# Patient Record
Sex: FEMALE | Race: ASIAN | NOT HISPANIC OR LATINO | Employment: UNEMPLOYED | URBAN - METROPOLITAN AREA
[De-identification: names, ages, dates, MRNs, and addresses within clinical notes are randomized per-mention and may not be internally consistent; named-entity substitution may affect disease eponyms.]

---

## 2024-08-23 ENCOUNTER — LAB (OUTPATIENT)
Dept: LAB | Facility: LAB | Age: 44
End: 2024-08-23
Payer: COMMERCIAL

## 2024-08-23 ENCOUNTER — APPOINTMENT (OUTPATIENT)
Dept: PRIMARY CARE | Facility: CLINIC | Age: 44
End: 2024-08-23

## 2024-08-23 VITALS
SYSTOLIC BLOOD PRESSURE: 120 MMHG | TEMPERATURE: 98 F | HEIGHT: 59 IN | DIASTOLIC BLOOD PRESSURE: 85 MMHG | BODY MASS INDEX: 31.45 KG/M2 | OXYGEN SATURATION: 100 % | HEART RATE: 54 BPM | WEIGHT: 156 LBS | RESPIRATION RATE: 18 BRPM

## 2024-08-23 DIAGNOSIS — N94.6 DYSMENORRHEA: ICD-10-CM

## 2024-08-23 DIAGNOSIS — Z12.31 ENCOUNTER FOR SCREENING MAMMOGRAM FOR BREAST CANCER: Primary | ICD-10-CM

## 2024-08-23 DIAGNOSIS — Z00.00 ANNUAL PHYSICAL EXAM: ICD-10-CM

## 2024-08-23 LAB
ALBUMIN SERPL BCP-MCNC: 4.7 G/DL (ref 3.4–5)
ALP SERPL-CCNC: 75 U/L (ref 33–110)
ALT SERPL W P-5'-P-CCNC: 18 U/L (ref 7–45)
ANION GAP SERPL CALC-SCNC: 15 MMOL/L (ref 10–20)
AST SERPL W P-5'-P-CCNC: 27 U/L (ref 9–39)
BILIRUB SERPL-MCNC: 0.6 MG/DL (ref 0–1.2)
BUN SERPL-MCNC: 16 MG/DL (ref 6–23)
CALCIUM SERPL-MCNC: 9.9 MG/DL (ref 8.6–10.3)
CHLORIDE SERPL-SCNC: 101 MMOL/L (ref 98–107)
CHOLEST SERPL-MCNC: 218 MG/DL (ref 0–199)
CHOLESTEROL/HDL RATIO: 3.4
CO2 SERPL-SCNC: 26 MMOL/L (ref 21–32)
CREAT SERPL-MCNC: 0.85 MG/DL (ref 0.5–1.05)
EGFRCR SERPLBLD CKD-EPI 2021: 88 ML/MIN/1.73M*2
ERYTHROCYTE [DISTWIDTH] IN BLOOD BY AUTOMATED COUNT: 12.4 % (ref 11.5–14.5)
EST. AVERAGE GLUCOSE BLD GHB EST-MCNC: 105 MG/DL
GLUCOSE SERPL-MCNC: 89 MG/DL (ref 74–99)
HBA1C MFR BLD: 5.3 %
HCT VFR BLD AUTO: 45.4 % (ref 36–46)
HDLC SERPL-MCNC: 64.2 MG/DL
HGB BLD-MCNC: 14.7 G/DL (ref 12–16)
LDLC SERPL CALC-MCNC: 138 MG/DL
MCH RBC QN AUTO: 27.9 PG (ref 26–34)
MCHC RBC AUTO-ENTMCNC: 32.4 G/DL (ref 32–36)
MCV RBC AUTO: 86 FL (ref 80–100)
NON HDL CHOLESTEROL: 154 MG/DL (ref 0–149)
NRBC BLD-RTO: 0 /100 WBCS (ref 0–0)
PLATELET # BLD AUTO: 301 X10*3/UL (ref 150–450)
POTASSIUM SERPL-SCNC: 4.4 MMOL/L (ref 3.5–5.3)
PROT SERPL-MCNC: 7.9 G/DL (ref 6.4–8.2)
RBC # BLD AUTO: 5.26 X10*6/UL (ref 4–5.2)
SODIUM SERPL-SCNC: 138 MMOL/L (ref 136–145)
TRIGL SERPL-MCNC: 79 MG/DL (ref 0–149)
VLDL: 16 MG/DL (ref 0–40)
WBC # BLD AUTO: 7.3 X10*3/UL (ref 4.4–11.3)

## 2024-08-23 PROCEDURE — 99386 PREV VISIT NEW AGE 40-64: CPT | Performed by: PHYSICIAN ASSISTANT

## 2024-08-23 PROCEDURE — 80061 LIPID PANEL: CPT

## 2024-08-23 PROCEDURE — 36415 COLL VENOUS BLD VENIPUNCTURE: CPT

## 2024-08-23 PROCEDURE — 80053 COMPREHEN METABOLIC PANEL: CPT

## 2024-08-23 PROCEDURE — 83036 HEMOGLOBIN GLYCOSYLATED A1C: CPT

## 2024-08-23 PROCEDURE — 85027 COMPLETE CBC AUTOMATED: CPT

## 2024-08-23 PROCEDURE — 3008F BODY MASS INDEX DOCD: CPT | Performed by: PHYSICIAN ASSISTANT

## 2024-08-23 RX ORDER — BISMUTH SUBSALICYLATE 262 MG
1 TABLET,CHEWABLE ORAL DAILY
COMMUNITY

## 2024-08-23 RX ORDER — ACETAMINOPHEN 500 MG
TABLET ORAL DAILY
COMMUNITY

## 2024-08-23 ASSESSMENT — ENCOUNTER SYMPTOMS
CONSTIPATION: 0
NAUSEA: 0
MYALGIAS: 0
SHORTNESS OF BREATH: 0
EYE PAIN: 0
DIARRHEA: 0
ARTHRALGIAS: 0
ABDOMINAL PAIN: 0
VOMITING: 0

## 2024-08-23 NOTE — ASSESSMENT & PLAN NOTE
PREVENTIVE CARE SCREENING:  - Labs:  Due, ordered today  Vaccines:   - Tetanus (q10yrs): Has records from [facility] but did not bring today. Will bring next week.  Women's health:  - PAP: DUE. Pt interested, encouraged to schedule  - Mammogram: discussed risks vs benefits - pt elects to mammogram   Lifestyle Modification:  - Discussed DIET -  Continue to limit snacks, processed foods, sugary and greasy foods, fast foods. Continue with healthy alternatives, whole grains, fruits vegetables.  - Encouraged to take daily multivitamin.    - Discussed EXERCISE -   Recommended weight training for bone health and 30 minutes of cardiovascular exercise 5-7 days a week.  - Encouraged pt to get yearly eye and dental exams

## 2024-08-23 NOTE — PROGRESS NOTES
"Subjective   Patient ID: Netta Banda is a 43 y.o. female who presents for Establish Care (Pt here today to [facility]; last check up was in the [facility] a year ago. Needing a check up, lab orders, Mammogram, etc. Lives in [place] but wanting a doctor here. ).    HPI     Preventive:   - Lives at home in -- with --   - Employment -   - Labs:   - Colon CA:  - Mamm:  - PAP:   - DEXA:   - Flu:  - Shingrix:  - Pneumovax/ Prevnar:  - Td:  - COVID-19 vax:   - Lung CA screen (Smoker):   - Diet:   - Exercise:   - Sexual hx:   - Tobacco:   - Illicit drugs:   - Alcohol:   - Mood:   - Dentist visits:  - Eye exams:          Review of Systems    Objective   /90   Pulse 54   Temp 36.7 °C (98 °F)   Resp 18   Ht 1.486 m (4' 10.5\")   Wt 70.8 kg (156 lb)   SpO2 100%   BMI 32.05 kg/m²     Physical Exam    Assessment/Plan     Problem List Items Addressed This Visit    None      "

## 2024-08-23 NOTE — PROGRESS NOTES
"Subjective   Patient ID: Netta Banda is a 43 y.o. female who presents for Establish Care (Pt here today to [facility]; last check up was in the [facility] a year ago. Needing a check up, lab orders, Mammogram, etc. Lives in [place] but wanting a doctor here. ).    HPI     Preventive:   - Lives at home in [place] with  ( Tao) and son (Michael).  - Employment - currently unemployed  - Labs: DUE  - Mamm: DUE. Pt interested   - PAP: DUE. Pt interested.   - Td: Has records from [facility] but did not bring today.   - Diet: States is going well right now. Lots of chicken and fish. Regularly having fruits and vegetables.  - Exercise: Briskly walks an hour every day  - Sexual hx: Yes  - Tobacco: No  - Illicit drugs: No  - Alcohol: No  - Mood: Feeling a little down and anxious due to not having a job right now  - Dentist visits: DUE  - Eye exams: DUE     Pre-Menopausal  - Still having regular period but is more painful than usual  -Also experience muscle pain and headaches while on her period    Review of Systems   Eyes:  Negative for pain and visual disturbance.   Respiratory:  Negative for shortness of breath.    Cardiovascular:  Negative for chest pain.   Gastrointestinal:  Negative for abdominal pain, constipation, diarrhea, nausea and vomiting.   Musculoskeletal:  Negative for arthralgias and myalgias.   Skin:  Negative for rash.       Objective   /85   Pulse 54   Temp 36.7 °C (98 °F)   Resp 18   Ht 1.486 m (4' 10.5\")   Wt 70.8 kg (156 lb)   SpO2 100%   BMI 32.05 kg/m²     Physical Exam  Constitutional:       General: She is not in acute distress.     Appearance: She is obese.   HENT:      Head: Normocephalic.      Right Ear: Tympanic membrane and ear canal normal.      Left Ear: Tympanic membrane and ear canal normal.      Nose: Nose normal.      Mouth/Throat:      Mouth: Mucous membranes are moist.      Pharynx: Oropharynx is clear.   Eyes:      Extraocular Movements: Extraocular movements intact.    "   Conjunctiva/sclera: Conjunctivae normal.      Pupils: Pupils are equal, round, and reactive to light.   Cardiovascular:      Rate and Rhythm: Normal rate and regular rhythm.      Pulses: Normal pulses.      Heart sounds: No murmur heard.  Pulmonary:      Effort: Pulmonary effort is normal.      Breath sounds: Normal breath sounds. No wheezing, rhonchi or rales.   Abdominal:      General: Bowel sounds are normal. There is no distension.      Palpations: Abdomen is soft. There is no mass.      Tenderness: There is no abdominal tenderness. There is no guarding.   Musculoskeletal:         General: Normal range of motion.      Cervical back: Neck supple.   Lymphadenopathy:      Cervical: No cervical adenopathy.   Skin:     General: Skin is warm and dry.      Findings: No lesion or rash.   Neurological:      General: No focal deficit present.      Mental Status: She is alert.      Gait: Gait normal.   Psychiatric:         Mood and Affect: Mood normal.         Assessment/Plan     Problem List Items Addressed This Visit       Annual physical exam    Overview     - Lives in [place] with  (Alexis) and son (Michael). Spends some time here as well living with mother in law.   - Employment - currently unemployed         Current Assessment & Plan     PREVENTIVE CARE SCREENING:  - Labs:  Due, ordered today  Vaccines:   - Tetanus (q10yrs): Has records from [facility] but did not bring today. Will bring next week.  Women's health:  - PAP: DUE. Pt interested, encouraged to schedule  - Mammogram: discussed risks vs benefits - pt elects to mammogram   Lifestyle Modification:  - Discussed DIET -  Continue to limit snacks, processed foods, sugary and greasy foods, fast foods. Continue with healthy alternatives, whole grains, fruits vegetables.  - Encouraged to take daily multivitamin.    - Discussed EXERCISE -   Recommended weight training for bone health and 30 minutes of cardiovascular exercise 5-7 days a week.  - Encouraged pt to  get yearly eye and dental exams          Relevant Orders    CBC (Completed)    Comprehensive Metabolic Panel (Completed)    Lipid Panel (Completed)    Hemoglobin A1C (Completed)     Other Visit Diagnoses       Encounter for screening mammogram for breast cancer    -  Primary    Relevant Orders    BI mammo bilateral screening tomosynthesis    Dysmenorrhea

## 2024-08-23 NOTE — PROGRESS NOTES
"Subjective   Patient ID: Netta Banda is a 43 y.o. female who presents for Establish Care (Pt here today to [facility]; last check up was in the [facility] a year ago. Needing a check up, lab orders, Mammogram, etc. Lives in [place] but wanting a doctor here. ).    HPI     Preventive:   - Lives at home in [place] with  ( Tao) and son (Michael).  - Employment - currently unemployed  - Labs: DUE  - Mamm: DUE. Pt interested   - PAP: DUE. Pt interested.   - Td: Has records from [facility] but did not bring today.   - Diet: States is going well right now. Lots of chicken and fish. Regularly having fruits and vegetables.  - Exercise: Briskly walks an hour every day  - Sexual hx: Yes  - Tobacco: No  - Illicit drugs: No  - Alcohol: No  - Mood: Feeling a little down and anxious due to not having a job right now  - Dentist visits: DUE  - Eye exams: DUE    Dysmenorrhea  - Still having regular period but is more painful than usual  -Also experience muscle pain and headaches while on her period  -Denies hot flashes    Review of Systems   Genitourinary:  Positive for menstrual problem.       Objective   /85   Pulse 54   Temp 36.7 °C (98 °F)   Resp 18   Ht 1.486 m (4' 10.5\")   Wt 70.8 kg (156 lb)   SpO2 100%   BMI 32.05 kg/m²     Physical Exam  Constitutional:       General: She is not in acute distress.     Appearance: Normal appearance. She is obese. She is not ill-appearing, toxic-appearing or diaphoretic.   HENT:      Head: Normocephalic and atraumatic.      Right Ear: Tympanic membrane, ear canal and external ear normal.      Left Ear: Tympanic membrane, ear canal and external ear normal.      Nose: Nose normal.      Mouth/Throat:      Mouth: Mucous membranes are moist.      Pharynx: Oropharynx is clear.   Eyes:      Extraocular Movements: Extraocular movements intact.      Conjunctiva/sclera: Conjunctivae normal.      Pupils: Pupils are equal, round, and reactive to light.   Cardiovascular:      Rate and " Rhythm: Normal rate and regular rhythm.      Pulses: Normal pulses.      Heart sounds: Normal heart sounds. No murmur heard.  Pulmonary:      Effort: Pulmonary effort is normal.      Breath sounds: Normal breath sounds.   Abdominal:      General: Abdomen is flat. Bowel sounds are normal.      Palpations: Abdomen is soft.   Musculoskeletal:      Right lower leg: No edema.      Left lower leg: No edema.   Skin:     General: Skin is warm and dry.   Neurological:      General: No focal deficit present.      Mental Status: She is alert and oriented to person, place, and time.   Psychiatric:         Attention and Perception: Attention normal.         Mood and Affect: Mood is anxious.         Speech: Speech normal.         Behavior: Behavior normal.         Thought Content: Thought content normal.         Cognition and Memory: Cognition normal.         Judgment: Judgment normal.         Assessment/Plan     Problem List Items Addressed This Visit          Health Encounters    Annual physical exam    Current Assessment & Plan     PREVENTIVE CARE SCREENING:  - Labs:  Due, ordered today  Vaccines:   - Tetanus (q10yrs): Has records from [facility] but did not bring today. Will bring next week.  Women's health:  - PAP: DUE. Pt interested, encouraged to schedule  - Mammogram: discussed risks vs benefits - pt elects to mammogram   Lifestyle Modification:  - Discussed DIET -  Continue to limit snacks, processed foods, sugary and greasy foods, fast foods. Continue with healthy alternatives, whole grains, fruits vegetables.  - Encouraged to take daily multivitamin.    - Discussed EXERCISE -   Recommended weight training for bone health and 30 minutes of cardiovascular exercise 5-7 days a week.  - Encouraged pt to get yearly eye and dental exams          Relevant Orders    CBC    Comprehensive Metabolic Panel    Lipid Panel    Hemoglobin A1C     Other Visit Diagnoses       Encounter for screening mammogram for breast cancer    -   Primary    Relevant Orders    BI mammo bilateral screening tomosynthesis    Dysmenorrhea            Dysmenorrhea  Most likely due to hormonal imbalance. Pt prefers to continue with OTC ibuprofen at this time. Encouraged to return with worsening of symptoms. Pt agreeable to plan.    Elma Parnell, PA-S2      I was present with the PA student who participated in the documentation of this note. I have personally seen and re-examined the patient and performed the medical decision-making components (assessment and plan of care). I have reviewed the PA student documentation and verified the findings in the note as written with additions or exceptions as stated in the body of this note.    EMILY London-C